# Patient Record
Sex: MALE | Race: WHITE | Employment: OTHER | URBAN - METROPOLITAN AREA
[De-identification: names, ages, dates, MRNs, and addresses within clinical notes are randomized per-mention and may not be internally consistent; named-entity substitution may affect disease eponyms.]

---

## 2017-01-01 ENCOUNTER — HOSPITAL ENCOUNTER (OUTPATIENT)
Dept: LAB | Facility: HOSPITAL | Age: 72
Discharge: HOME | End: 2017-01-01
Attending: INTERNAL MEDICINE
Payer: MEDICARE

## 2017-01-01 ENCOUNTER — HOSPITAL ENCOUNTER (OUTPATIENT)
Dept: CARDIOLOGY CLINIC | Facility: HOSPITAL | Age: 72
Discharge: HOME | End: 2017-01-01
Attending: INTERNAL MEDICINE

## 2017-01-01 DIAGNOSIS — I65.23 BILATERAL CAROTID ARTERY STENOSIS: ICD-10-CM

## 2017-01-01 DIAGNOSIS — I48.91 ATRIAL FIBRILLATION (HCC): ICD-10-CM

## 2017-01-01 PROCEDURE — 85610 PROTHROMBIN TIME: CPT

## 2017-02-08 ENCOUNTER — LAB ENCOUNTER (OUTPATIENT)
Dept: LAB | Facility: HOSPITAL | Age: 72
End: 2017-02-08
Attending: INTERNAL MEDICINE
Payer: MEDICARE

## 2017-02-08 ENCOUNTER — PRIOR ORIGINAL RECORDS (OUTPATIENT)
Dept: OTHER | Age: 72
End: 2017-02-08

## 2017-02-08 DIAGNOSIS — E78.00 PURE HYPERCHOLESTEROLEMIA: Primary | ICD-10-CM

## 2017-02-08 LAB
CHOLEST SMN-MCNC: 186 MG/DL (ref ?–200)
HDLC SERPL-MCNC: 57 MG/DL (ref 45–?)
HDLC SERPL: 3.26 {RATIO} (ref ?–4.97)
LDLC SERPL CALC-MCNC: 64 MG/DL (ref ?–130)
NONHDLC SERPL-MCNC: 129 MG/DL (ref ?–130)
TRIGLYCERIDES: 324 MG/DL (ref ?–150)
VLDL: 65 MG/DL (ref 5–40)

## 2017-02-08 PROCEDURE — 36415 COLL VENOUS BLD VENIPUNCTURE: CPT

## 2017-02-08 PROCEDURE — 80061 LIPID PANEL: CPT

## 2017-02-09 ENCOUNTER — HOSPITAL ENCOUNTER (OUTPATIENT)
Dept: LAB | Facility: HOSPITAL | Age: 72
Discharge: HOME | End: 2017-02-09
Attending: INTERNAL MEDICINE
Payer: MEDICARE

## 2017-02-09 DIAGNOSIS — I48.91 ATRIAL FIBRILLATION (HCC): ICD-10-CM

## 2017-02-09 LAB
CHOLESTEROL, TOTAL: 186 MG/DL
HDL CHOLESTEROL: 57 MG/DL
LDL CHOLESTEROL: 64 MG/DL
POC INR: 1.9 (ref 0.8–1.3)
TRIGLYCERIDES: 324 MG/DL

## 2017-02-09 PROCEDURE — 85610 PROTHROMBIN TIME: CPT

## 2017-02-13 ENCOUNTER — PRIOR ORIGINAL RECORDS (OUTPATIENT)
Dept: OTHER | Age: 72
End: 2017-02-13

## 2017-03-29 ENCOUNTER — HOSPITAL ENCOUNTER (OUTPATIENT)
Dept: LAB | Facility: HOSPITAL | Age: 72
Discharge: HOME | End: 2017-03-29
Attending: INTERNAL MEDICINE
Payer: MEDICARE

## 2017-03-29 ENCOUNTER — PRIOR ORIGINAL RECORDS (OUTPATIENT)
Dept: OTHER | Age: 72
End: 2017-03-29

## 2017-03-29 DIAGNOSIS — I48.91 ATRIAL FIBRILLATION (HCC): ICD-10-CM

## 2017-03-29 LAB — POC INR: 1.9 (ref 0.8–1.3)

## 2017-03-29 PROCEDURE — 85610 PROTHROMBIN TIME: CPT

## 2017-04-12 ENCOUNTER — HOSPITAL ENCOUNTER (OUTPATIENT)
Dept: LAB | Facility: HOSPITAL | Age: 72
Discharge: HOME | End: 2017-04-12
Attending: INTERNAL MEDICINE
Payer: MEDICARE

## 2017-04-12 DIAGNOSIS — I48.91 ATRIAL FIBRILLATION (HCC): ICD-10-CM

## 2017-04-12 PROCEDURE — 85610 PROTHROMBIN TIME: CPT

## 2017-04-27 ENCOUNTER — LAB ENCOUNTER (OUTPATIENT)
Dept: LAB | Facility: HOSPITAL | Age: 72
End: 2017-04-27
Attending: INTERNAL MEDICINE
Payer: MEDICARE

## 2017-04-27 ENCOUNTER — PRIOR ORIGINAL RECORDS (OUTPATIENT)
Dept: OTHER | Age: 72
End: 2017-04-27

## 2017-04-27 DIAGNOSIS — E78.00 PURE HYPERCHOLESTEROLEMIA: Primary | ICD-10-CM

## 2017-04-27 PROCEDURE — 80053 COMPREHEN METABOLIC PANEL: CPT

## 2017-04-27 PROCEDURE — 84443 ASSAY THYROID STIM HORMONE: CPT

## 2017-04-27 PROCEDURE — 80061 LIPID PANEL: CPT

## 2017-04-27 PROCEDURE — 84153 ASSAY OF PSA TOTAL: CPT

## 2017-04-27 PROCEDURE — 36415 COLL VENOUS BLD VENIPUNCTURE: CPT

## 2017-04-28 LAB
ALT (SGPT): 55 U/L
AST (SGOT): 43 U/L
CHOLESTEROL, TOTAL: 154 MG/DL
GLUCOSE: 104 MG/DL
HDL CHOLESTEROL: 54 MG/DL
LDL CHOLESTEROL: 65 MG/DL
THYROID STIMULATING HORMONE: 3.95 MLU/L
TRIGLYCERIDES: 174 MG/DL

## 2017-05-01 LAB
ALKALINE PHOSPHATATE(ALK PHOS): 51 IU/L
BUN: 10 MG/DL
CHOLESTEROL, TOTAL: 154 MG/DL
CREATININE, SERUM: 0.96 MG/DL
GLUCOSE: 104 MG/DL
HDL CHOLESTEROL: 54 MG/DL
LDL CHOLESTEROL: 65 MG/DL
POTASSIUM, SERUM: 3.7 MEQ/L
SGOT (AST): 43 IU/L
SGPT (ALT): 55 IU/L
SODIUM: 141 MEQ/L
TRIGLYCERIDES: 174 MG/DL

## 2017-05-03 ENCOUNTER — PRIOR ORIGINAL RECORDS (OUTPATIENT)
Dept: OTHER | Age: 72
End: 2017-05-03

## 2017-05-18 ENCOUNTER — HOSPITAL ENCOUNTER (OUTPATIENT)
Dept: LAB | Facility: HOSPITAL | Age: 72
Discharge: HOME | End: 2017-05-18
Attending: INTERNAL MEDICINE
Payer: MEDICARE

## 2017-05-18 DIAGNOSIS — I48.91 ATRIAL FIBRILLATION (HCC): ICD-10-CM

## 2017-05-18 PROCEDURE — 85610 PROTHROMBIN TIME: CPT

## 2017-05-30 ENCOUNTER — HOSPITAL ENCOUNTER (OUTPATIENT)
Dept: LAB | Facility: HOSPITAL | Age: 72
Discharge: HOME | End: 2017-05-30
Attending: INTERNAL MEDICINE
Payer: MEDICARE

## 2017-05-30 DIAGNOSIS — I48.91 ATRIAL FIBRILLATION (HCC): ICD-10-CM

## 2017-05-30 PROCEDURE — 85610 PROTHROMBIN TIME: CPT

## 2017-06-27 PROBLEM — M25.511 ACUTE PAIN OF RIGHT SHOULDER: Status: ACTIVE | Noted: 2017-06-27

## 2017-07-03 ENCOUNTER — HOSPITAL ENCOUNTER (OUTPATIENT)
Dept: LAB | Facility: HOSPITAL | Age: 72
Discharge: HOME | End: 2017-07-03
Attending: INTERNAL MEDICINE
Payer: MEDICARE

## 2017-07-03 DIAGNOSIS — I48.91 ATRIAL FIBRILLATION (HCC): ICD-10-CM

## 2017-07-03 LAB — POC INR: 3.1 (ref 0.8–1.3)

## 2017-07-03 PROCEDURE — 85610 PROTHROMBIN TIME: CPT

## 2017-07-31 ENCOUNTER — HOSPITAL ENCOUNTER (OUTPATIENT)
Dept: LAB | Facility: HOSPITAL | Age: 72
Discharge: HOME | End: 2017-07-31
Attending: INTERNAL MEDICINE
Payer: MEDICARE

## 2017-07-31 DIAGNOSIS — I48.91 ATRIAL FIBRILLATION (HCC): ICD-10-CM

## 2017-07-31 LAB — POC INR: 2.3 (ref 0.8–1.3)

## 2017-07-31 PROCEDURE — 85610 PROTHROMBIN TIME: CPT

## 2017-08-14 PROBLEM — S86.891A SHIN SPLINT, RIGHT, INITIAL ENCOUNTER: Status: ACTIVE | Noted: 2017-08-14

## 2017-08-21 ENCOUNTER — PRIOR ORIGINAL RECORDS (OUTPATIENT)
Dept: OTHER | Age: 72
End: 2017-08-21

## 2017-08-28 ENCOUNTER — HOSPITAL ENCOUNTER (OUTPATIENT)
Dept: LAB | Facility: HOSPITAL | Age: 72
Discharge: HOME | End: 2017-08-28
Attending: INTERNAL MEDICINE
Payer: MEDICARE

## 2017-08-28 DIAGNOSIS — I48.91 ATRIAL FIBRILLATION (HCC): ICD-10-CM

## 2017-08-28 LAB — POC INR: 2.7 (ref 0.8–1.3)

## 2017-08-28 PROCEDURE — 85610 PROTHROMBIN TIME: CPT

## 2017-09-25 ENCOUNTER — HOSPITAL ENCOUNTER (OUTPATIENT)
Dept: LAB | Facility: HOSPITAL | Age: 72
Discharge: HOME | End: 2017-09-25
Attending: INTERNAL MEDICINE
Payer: MEDICARE

## 2017-09-25 DIAGNOSIS — I48.91 ATRIAL FIBRILLATION (HCC): ICD-10-CM

## 2017-09-25 LAB — POC INR: 2.8 (ref 0.8–1.3)

## 2017-09-25 PROCEDURE — 85610 PROTHROMBIN TIME: CPT

## 2017-11-09 ENCOUNTER — PRIOR ORIGINAL RECORDS (OUTPATIENT)
Dept: OTHER | Age: 72
End: 2017-11-09

## 2017-11-14 ENCOUNTER — PRIOR ORIGINAL RECORDS (OUTPATIENT)
Dept: OTHER | Age: 72
End: 2017-11-14

## 2017-12-11 ENCOUNTER — PRIOR ORIGINAL RECORDS (OUTPATIENT)
Dept: OTHER | Age: 72
End: 2017-12-11

## 2017-12-12 ENCOUNTER — PRIOR ORIGINAL RECORDS (OUTPATIENT)
Dept: OTHER | Age: 72
End: 2017-12-12

## 2018-01-01 ENCOUNTER — HOSPITAL ENCOUNTER (OUTPATIENT)
Dept: LAB | Facility: HOSPITAL | Age: 73
Discharge: HOME | End: 2018-01-01
Attending: INTERNAL MEDICINE
Payer: MEDICARE

## 2018-01-01 ENCOUNTER — LAB ENCOUNTER (OUTPATIENT)
Dept: LAB | Facility: HOSPITAL | Age: 73
End: 2018-01-01
Attending: INTERNAL MEDICINE
Payer: MEDICARE

## 2018-01-01 ENCOUNTER — APPOINTMENT (OUTPATIENT)
Dept: GENERAL RADIOLOGY | Facility: HOSPITAL | Age: 73
DRG: 064 | End: 2018-01-01
Attending: EMERGENCY MEDICINE
Payer: MEDICARE

## 2018-01-01 ENCOUNTER — APPOINTMENT (OUTPATIENT)
Dept: CT IMAGING | Facility: HOSPITAL | Age: 73
DRG: 064 | End: 2018-01-01
Attending: EMERGENCY MEDICINE
Payer: MEDICARE

## 2018-01-01 ENCOUNTER — HOSPITAL ENCOUNTER (INPATIENT)
Facility: HOSPITAL | Age: 73
LOS: 1 days | DRG: 064 | End: 2018-01-01
Attending: EMERGENCY MEDICINE | Admitting: INTERNAL MEDICINE
Payer: MEDICARE

## 2018-01-01 ENCOUNTER — APPOINTMENT (OUTPATIENT)
Dept: GENERAL RADIOLOGY | Facility: HOSPITAL | Age: 73
DRG: 064 | End: 2018-01-01
Attending: INTERNAL MEDICINE
Payer: MEDICARE

## 2018-01-01 ENCOUNTER — HOSPITAL ENCOUNTER (OUTPATIENT)
Dept: LAB | Facility: HOSPITAL | Age: 73
Discharge: HOME | DRG: 064 | End: 2018-01-01
Attending: INTERNAL MEDICINE
Payer: MEDICARE

## 2018-01-01 ENCOUNTER — HOSPITAL ENCOUNTER (OUTPATIENT)
Dept: ULTRASOUND IMAGING | Facility: HOSPITAL | Age: 73
Discharge: HOME | End: 2018-01-01
Attending: INTERNAL MEDICINE
Payer: MEDICARE

## 2018-01-01 VITALS
WEIGHT: 232.13 LBS | OXYGEN SATURATION: 97 % | BODY MASS INDEX: 32.5 KG/M2 | DIASTOLIC BLOOD PRESSURE: 77 MMHG | TEMPERATURE: 99 F | HEIGHT: 71 IN | SYSTOLIC BLOOD PRESSURE: 141 MMHG

## 2018-01-01 DIAGNOSIS — Z79.01 ON WARFARIN FOR ATRIAL FIBRILLATION (HCC): ICD-10-CM

## 2018-01-01 DIAGNOSIS — I62.9 INTRACRANIAL HEMORRHAGE (HCC): Primary | ICD-10-CM

## 2018-01-01 DIAGNOSIS — I48.20 CHRONIC ATRIAL FIBRILLATION (HCC): ICD-10-CM

## 2018-01-01 DIAGNOSIS — E78.00 PURE HYPERCHOLESTEROLEMIA: Primary | ICD-10-CM

## 2018-01-01 DIAGNOSIS — E78.00 PURE HYPERCHOLESTEROLEMIA: ICD-10-CM

## 2018-01-01 DIAGNOSIS — I48.91 ATRIAL FIBRILLATION (HCC): ICD-10-CM

## 2018-01-01 DIAGNOSIS — I10 ESSENTIAL HYPERTENSION, MALIGNANT: Primary | ICD-10-CM

## 2018-01-01 DIAGNOSIS — I10 ESSENTIAL HYPERTENSION, MALIGNANT: ICD-10-CM

## 2018-01-01 DIAGNOSIS — I48.91 ON WARFARIN FOR ATRIAL FIBRILLATION (HCC): ICD-10-CM

## 2018-01-01 DIAGNOSIS — I71.4 ABDOMINAL AORTIC ANEURYSM WITHOUT RUPTURE (HCC): ICD-10-CM

## 2018-01-01 DIAGNOSIS — N40.0 BENIGN ENLARGEMENT OF PROSTATE: ICD-10-CM

## 2018-01-01 LAB
ALT SERPL-CCNC: 33 U/L (ref 17–63)
ALT SERPL-CCNC: 37 U/L (ref 17–63)
AST SERPL-CCNC: 26 U/L (ref 15–41)
AST SERPL-CCNC: 29 U/L (ref 15–41)
BUN BLD-MCNC: 14 MG/DL (ref 8–20)
CALCIUM BLD-MCNC: 9.9 MG/DL (ref 8.3–10.3)
CHLORIDE: 105 MMOL/L (ref 101–111)
CHOLEST SMN-MCNC: 166 MG/DL (ref ?–200)
CHOLEST SMN-MCNC: 167 MG/DL (ref ?–200)
CO2: 32 MMOL/L (ref 22–32)
CREAT BLD-MCNC: 1 MG/DL (ref 0.7–1.3)
GLUCOSE BLD-MCNC: 100 MG/DL (ref 70–99)
HDLC SERPL-MCNC: 52 MG/DL (ref 40–59)
HDLC SERPL-MCNC: 55 MG/DL (ref 45–?)
HDLC SERPL: 3.04 {RATIO} (ref ?–4.97)
INR BLD: 2.17 (ref 0.9–1.1)
INR BLD: 2.38 (ref 0.9–1.1)
LDLC SERPL CALC-MCNC: 70 MG/DL (ref ?–130)
LDLC SERPL CALC-MCNC: 80 MG/DL (ref ?–100)
NONHDLC SERPL-MCNC: 112 MG/DL (ref ?–130)
NONHDLC SERPL-MCNC: 114 MG/DL (ref ?–130)
POC INR: 2.1 (ref 0.8–1.3)
POC INR: 2.4 (ref 0.8–1.3)
POC INR: 2.5 (ref 0.8–1.3)
POTASSIUM SERPL-SCNC: 3.2 MMOL/L (ref 3.6–5.1)
PSA SERPL DL<=0.01 NG/ML-MCNC: 24.9 SECONDS (ref 12.4–14.7)
PSA SERPL DL<=0.01 NG/ML-MCNC: 26.8 SECONDS (ref 12.4–14.7)
SODIUM SERPL-SCNC: 142 MMOL/L (ref 136–144)
TRIGL SERPL-MCNC: 172 MG/DL (ref 30–149)
TRIGL SERPL-MCNC: 208 MG/DL (ref ?–150)
VLDLC SERPL CALC-MCNC: 34 MG/DL (ref 0–30)
VLDLC SERPL CALC-MCNC: 42 MG/DL (ref 5–40)

## 2018-01-01 PROCEDURE — 99291 CRITICAL CARE FIRST HOUR: CPT | Performed by: OTHER

## 2018-01-01 PROCEDURE — 85610 PROTHROMBIN TIME: CPT

## 2018-01-01 PROCEDURE — 85610 PROTHROMBIN TIME: CPT | Performed by: INTERNAL MEDICINE

## 2018-01-01 PROCEDURE — 84460 ALANINE AMINO (ALT) (SGPT): CPT

## 2018-01-01 PROCEDURE — 36415 COLL VENOUS BLD VENIPUNCTURE: CPT | Performed by: INTERNAL MEDICINE

## 2018-01-01 PROCEDURE — 36415 COLL VENOUS BLD VENIPUNCTURE: CPT

## 2018-01-01 PROCEDURE — 99232 SBSQ HOSP IP/OBS MODERATE 35: CPT | Performed by: INTERNAL MEDICINE

## 2018-01-01 PROCEDURE — 76770 US EXAM ABDO BACK WALL COMP: CPT | Performed by: INTERNAL MEDICINE

## 2018-01-01 PROCEDURE — 84153 ASSAY OF PSA TOTAL: CPT

## 2018-01-01 PROCEDURE — 70450 CT HEAD/BRAIN W/O DYE: CPT | Performed by: EMERGENCY MEDICINE

## 2018-01-01 PROCEDURE — 80048 BASIC METABOLIC PNL TOTAL CA: CPT

## 2018-01-01 PROCEDURE — 71045 X-RAY EXAM CHEST 1 VIEW: CPT | Performed by: INTERNAL MEDICINE

## 2018-01-01 PROCEDURE — 99223 1ST HOSP IP/OBS HIGH 75: CPT | Performed by: INTERNAL MEDICINE

## 2018-01-01 PROCEDURE — 80061 LIPID PANEL: CPT

## 2018-01-01 PROCEDURE — 99223 1ST HOSP IP/OBS HIGH 75: CPT | Performed by: NEUROLOGICAL SURGERY

## 2018-01-01 PROCEDURE — 70496 CT ANGIOGRAPHY HEAD: CPT | Performed by: EMERGENCY MEDICINE

## 2018-01-01 PROCEDURE — 80053 COMPREHEN METABOLIC PANEL: CPT

## 2018-01-01 PROCEDURE — 70498 CT ANGIOGRAPHY NECK: CPT | Performed by: EMERGENCY MEDICINE

## 2018-01-01 PROCEDURE — 5A1945Z RESPIRATORY VENTILATION, 24-96 CONSECUTIVE HOURS: ICD-10-PCS | Performed by: EMERGENCY MEDICINE

## 2018-01-01 PROCEDURE — 71045 X-RAY EXAM CHEST 1 VIEW: CPT | Performed by: EMERGENCY MEDICINE

## 2018-01-01 PROCEDURE — 84450 TRANSFERASE (AST) (SGOT): CPT

## 2018-01-01 PROCEDURE — 88305 TISSUE EXAM BY PATHOLOGIST: CPT | Performed by: INTERNAL MEDICINE

## 2018-01-01 PROCEDURE — 99233 SBSQ HOSP IP/OBS HIGH 50: CPT | Performed by: OTHER

## 2018-01-01 RX ORDER — ZOLPIDEM TARTRATE 5 MG/1
5 TABLET ORAL NIGHTLY PRN
Refills: 3 | COMMUNITY
Start: 2018-01-01

## 2018-01-01 RX ORDER — CHLORHEXIDINE GLUCONATE 0.12 MG/ML
15 RINSE ORAL 2 TIMES DAILY
Status: DISCONTINUED | OUTPATIENT
Start: 2018-01-01 | End: 2018-01-01

## 2018-01-01 RX ORDER — ONDANSETRON 2 MG/ML
4 INJECTION INTRAMUSCULAR; INTRAVENOUS EVERY 4 HOURS PRN
Status: DISCONTINUED | OUTPATIENT
Start: 2018-01-01 | End: 2018-01-01

## 2018-01-01 RX ORDER — SODIUM CHLORIDE 9 MG/ML
INJECTION, SOLUTION INTRAVENOUS CONTINUOUS
Status: ACTIVE | OUTPATIENT
Start: 2018-01-01 | End: 2018-01-01

## 2018-01-01 RX ORDER — DOPAMINE HYDROCHLORIDE 320 MG/100ML
INJECTION, SOLUTION INTRAVENOUS CONTINUOUS
Status: DISCONTINUED | OUTPATIENT
Start: 2018-01-01 | End: 2018-01-01

## 2018-01-01 RX ORDER — DOPAMINE HYDROCHLORIDE 320 MG/100ML
INJECTION, SOLUTION INTRAVENOUS
Status: DISCONTINUED
Start: 2018-01-01 | End: 2018-01-01

## 2018-01-01 RX ORDER — LORAZEPAM 2 MG/ML
1 INJECTION INTRAMUSCULAR
Status: DISCONTINUED | OUTPATIENT
Start: 2018-01-01 | End: 2018-01-01

## 2018-01-01 RX ORDER — BISACODYL 10 MG
10 SUPPOSITORY, RECTAL RECTAL
Status: DISCONTINUED | OUTPATIENT
Start: 2018-01-01 | End: 2018-01-01

## 2018-01-01 RX ORDER — DOCUSATE SODIUM 100 MG/1
100 CAPSULE, LIQUID FILLED ORAL 2 TIMES DAILY
Status: DISCONTINUED | OUTPATIENT
Start: 2018-01-01 | End: 2018-01-01

## 2018-01-01 RX ORDER — POLYETHYLENE GLYCOL 3350 17 G/17G
17 POWDER, FOR SOLUTION ORAL DAILY PRN
Status: DISCONTINUED | OUTPATIENT
Start: 2018-01-01 | End: 2018-01-01

## 2018-01-01 RX ORDER — SODIUM CHLORIDE 9 MG/ML
INJECTION, SOLUTION INTRAVENOUS CONTINUOUS
Status: DISCONTINUED | OUTPATIENT
Start: 2018-01-01 | End: 2018-01-01

## 2018-01-01 RX ORDER — SENNOSIDES 8.6 MG
17.2 TABLET ORAL NIGHTLY
Status: DISCONTINUED | OUTPATIENT
Start: 2018-01-01 | End: 2018-01-01

## 2018-01-01 RX ORDER — ACETAMINOPHEN 650 MG/1
650 SUPPOSITORY RECTAL EVERY 4 HOURS PRN
Status: DISCONTINUED | OUTPATIENT
Start: 2018-01-01 | End: 2018-01-01

## 2018-01-01 RX ORDER — MORPHINE SULFATE 4 MG/ML
2 INJECTION, SOLUTION INTRAMUSCULAR; INTRAVENOUS EVERY 2 HOUR PRN
Status: DISCONTINUED | OUTPATIENT
Start: 2018-01-01 | End: 2018-01-01

## 2018-01-01 RX ORDER — TADALAFIL 20 MG
20 TABLET ORAL
Refills: 6 | COMMUNITY
Start: 2018-01-01

## 2018-01-01 RX ORDER — SODIUM PHOSPHATE, DIBASIC AND SODIUM PHOSPHATE, MONOBASIC 7; 19 G/133ML; G/133ML
1 ENEMA RECTAL ONCE AS NEEDED
Status: DISCONTINUED | OUTPATIENT
Start: 2018-01-01 | End: 2018-01-01

## 2018-01-01 RX ORDER — CARVEDILOL 12.5 MG/1
12.5 TABLET ORAL 2 TIMES DAILY
Refills: 12 | COMMUNITY
Start: 2018-01-01

## 2018-01-01 RX ORDER — LABETALOL HYDROCHLORIDE 5 MG/ML
10 INJECTION, SOLUTION INTRAVENOUS EVERY 10 MIN PRN
Status: DISCONTINUED | OUTPATIENT
Start: 2018-01-01 | End: 2018-01-01

## 2018-01-01 RX ORDER — PHENYLEPHRINE HCL IN 0.9% NACL 50MG/250ML
PLASTIC BAG, INJECTION (ML) INTRAVENOUS CONTINUOUS PRN
Status: DISCONTINUED | OUTPATIENT
Start: 2018-01-01 | End: 2018-01-01

## 2018-01-01 RX ORDER — FAMOTIDINE 10 MG/ML
20 INJECTION, SOLUTION INTRAVENOUS DAILY
Status: DISCONTINUED | OUTPATIENT
Start: 2018-01-01 | End: 2018-01-01

## 2018-01-01 RX ORDER — ONDANSETRON 2 MG/ML
4 INJECTION INTRAMUSCULAR; INTRAVENOUS EVERY 6 HOURS PRN
Status: DISCONTINUED | OUTPATIENT
Start: 2018-01-01 | End: 2018-01-01

## 2018-01-01 RX ORDER — SPIRONOLACTONE 25 MG/1
12.5 TABLET ORAL
Refills: 2 | COMMUNITY
Start: 2018-01-01

## 2018-01-01 RX ORDER — MORPHINE SULFATE 4 MG/ML
1 INJECTION, SOLUTION INTRAMUSCULAR; INTRAVENOUS EVERY 2 HOUR PRN
Status: DISCONTINUED | OUTPATIENT
Start: 2018-01-01 | End: 2018-01-01

## 2018-01-01 RX ORDER — METOCLOPRAMIDE HYDROCHLORIDE 5 MG/ML
10 INJECTION INTRAMUSCULAR; INTRAVENOUS EVERY 8 HOURS PRN
Status: DISCONTINUED | OUTPATIENT
Start: 2018-01-01 | End: 2018-01-01

## 2018-01-01 RX ORDER — SCOLOPAMINE TRANSDERMAL SYSTEM 1 MG/1
1 PATCH, EXTENDED RELEASE TRANSDERMAL
Status: DISCONTINUED | OUTPATIENT
Start: 2018-01-01 | End: 2018-01-01

## 2018-01-01 RX ORDER — ACETAMINOPHEN 325 MG/1
650 TABLET ORAL EVERY 4 HOURS PRN
Status: DISCONTINUED | OUTPATIENT
Start: 2018-01-01 | End: 2018-01-01

## 2018-01-01 RX ORDER — FAMOTIDINE 20 MG/1
20 TABLET ORAL DAILY
Status: DISCONTINUED | OUTPATIENT
Start: 2018-01-01 | End: 2018-01-01

## 2018-01-01 RX ORDER — ONDANSETRON 2 MG/ML
4 INJECTION INTRAMUSCULAR; INTRAVENOUS ONCE
Status: COMPLETED | OUTPATIENT
Start: 2018-01-01 | End: 2018-01-01

## 2018-01-05 ENCOUNTER — PRIOR ORIGINAL RECORDS (OUTPATIENT)
Dept: OTHER | Age: 73
End: 2018-01-05

## 2018-02-07 ENCOUNTER — PRIOR ORIGINAL RECORDS (OUTPATIENT)
Dept: OTHER | Age: 73
End: 2018-02-07

## 2018-02-12 ENCOUNTER — PRIOR ORIGINAL RECORDS (OUTPATIENT)
Dept: OTHER | Age: 73
End: 2018-02-12

## 2018-02-15 LAB
BUN: 14 MG/DL
CALCIUM: 9.9 MG/DL
CHLORIDE: 105 MEQ/L
CHOLESTEROL, TOTAL: 167 MG/DL
CREATININE, SERUM: 1 MG/DL
GLUCOSE: 100 MG/DL
HDL CHOLESTEROL: 55 MG/DL
LDL CHOLESTEROL: 70 MG/DL
POTASSIUM, SERUM: 3.2 MEQ/L
SGOT (AST): 26 IU/L
SGPT (ALT): 33 IU/L
SODIUM: 142 MEQ/L
TRIGLYCERIDES: 208 MG/DL

## 2018-04-17 ENCOUNTER — PRIOR ORIGINAL RECORDS (OUTPATIENT)
Dept: OTHER | Age: 73
End: 2018-04-17

## 2018-04-18 ENCOUNTER — PRIOR ORIGINAL RECORDS (OUTPATIENT)
Dept: OTHER | Age: 73
End: 2018-04-18

## 2018-04-20 ENCOUNTER — PRIOR ORIGINAL RECORDS (OUTPATIENT)
Dept: OTHER | Age: 73
End: 2018-04-20

## 2018-04-24 ENCOUNTER — PRIOR ORIGINAL RECORDS (OUTPATIENT)
Dept: OTHER | Age: 73
End: 2018-04-24

## 2018-04-24 LAB
BUN: 15 MG/DL
CALCIUM: 9.8 MG/DL
CHLORIDE: 106 MEQ/L
CREATININE, SERUM: 1.24 MG/DL
GLUCOSE: 100 MG/DL
POTASSIUM, SERUM: 5.3 MEQ/L
SODIUM: 136 MEQ/L

## 2018-05-03 ENCOUNTER — PRIOR ORIGINAL RECORDS (OUTPATIENT)
Dept: OTHER | Age: 73
End: 2018-05-03

## 2018-05-09 ENCOUNTER — PRIOR ORIGINAL RECORDS (OUTPATIENT)
Dept: OTHER | Age: 73
End: 2018-05-09

## 2018-05-09 LAB
ALBUMIN: 4 G/DL
ALKALINE PHOSPHATATE(ALK PHOS): 45 IU/L
BILIRUBIN TOTAL: 1.6 MG/DL
BUN: 17 MG/DL
CALCIUM: 9.6 MG/DL
CHLORIDE: 107 MEQ/L
CHOLESTEROL, TOTAL: 112 MG/DL
CREATININE, SERUM: 0.98 MG/DL
GLUCOSE: 98 MG/DL
HDL CHOLESTEROL: 42 MG/DL
LDL CHOLESTEROL: 45 MG/DL
PROTEIN, TOTAL: 7.5 G/DL
SGOT (AST): 38 IU/L
SGPT (ALT): 51 IU/L
SODIUM: 141 MEQ/L
TRIGLYCERIDES: 124 MG/DL

## 2018-05-21 ENCOUNTER — PRIOR ORIGINAL RECORDS (OUTPATIENT)
Dept: OTHER | Age: 73
End: 2018-05-21

## 2018-05-29 ENCOUNTER — PRIOR ORIGINAL RECORDS (OUTPATIENT)
Dept: OTHER | Age: 73
End: 2018-05-29

## 2018-05-31 ENCOUNTER — PRIOR ORIGINAL RECORDS (OUTPATIENT)
Dept: OTHER | Age: 73
End: 2018-05-31

## 2018-08-23 ENCOUNTER — PRIOR ORIGINAL RECORDS (OUTPATIENT)
Dept: OTHER | Age: 73
End: 2018-08-23

## 2018-08-24 LAB
ALT (SGPT): 37 U/L
AST (SGOT): 29 U/L
CHOLESTEROL, TOTAL: 166 MG/DL
HDL CHOLESTEROL: 52 MG/DL
LDL CHOLESTEROL: 80 MG/DL
TRIGLYCERIDES: 172 MG/DL

## 2018-08-27 ENCOUNTER — PRIOR ORIGINAL RECORDS (OUTPATIENT)
Dept: OTHER | Age: 73
End: 2018-08-27

## 2018-10-08 LAB — INR: 2.79

## 2018-10-09 ENCOUNTER — PRIOR ORIGINAL RECORDS (OUTPATIENT)
Dept: OTHER | Age: 73
End: 2018-10-09

## 2018-10-09 PROBLEM — Z79.01 ON WARFARIN FOR ATRIAL FIBRILLATION (HCC): Status: ACTIVE | Noted: 2018-01-01

## 2018-10-09 PROBLEM — I48.91 ON WARFARIN FOR ATRIAL FIBRILLATION (HCC): Status: ACTIVE | Noted: 2018-01-01

## 2018-10-09 PROBLEM — I62.9 INTRACRANIAL HEMORRHAGE (HCC): Status: ACTIVE | Noted: 2018-01-01

## 2018-10-09 NOTE — PROGRESS NOTES
10/09/18 1119   Clinical Encounter Type   Visited With Patient and family together   Crisis Visit ED   Patient's Supportive Strategies/Resources  provided support to patient's spouse.      Sacramental Encounters   Sacrament of Sick-Anointing Anoi

## 2018-10-09 NOTE — PROGRESS NOTES
Thank you for consulting [facility] on this referral.     Mr. Ankit Martinez is in the first person registry and his wife has completed authorization paperwork with [facility] so that he can be an organ and tissue donor once declared brain dead.      Please call Gift o

## 2018-10-09 NOTE — CONSULTS
[facility]  Cardiology Consultation    Brian Ramirez Jr.  Patient Status:  Inpatient    1945 MRN [id number]   [facility] 6NE-A Attending Leonela Frost MD   Hosp Day # 0 PCP Trice Guzman MD     Reason for Consultation *Warfarin Sodium      5MG       ONE-HALF TO ONE TABLET BY MOUTH DUDLEY     02/12/18 *AmLODIPine Besylate  5MG       1 TABLET BY MOUTH DAILY                  02/12/18 *Carvedilol           12. 5MG    1 TABLET TWICE DAILY AS DIRECTED.        02/12/18 *Digox FLEET ENEMA (FLEET) 7-19 GM/118ML enema 133 mL, 1 enema, Rectal, Once PRN  •  ondansetron HCl (ZOFRAN) injection 4 mg, 4 mg, Intravenous, Q6H PRN **OR** Metoclopramide HCl (REGLAN) injection 10 mg, 10 mg, Intravenous, Q8H PRN  •  famoTIDine (PEPCID) tab 2 10/09/2018    ALT 42 10/09/2018    PTT 27.2 10/09/2018    INR 2.68 10/09/2018    TROP <0.046 10/09/2018   ekg: afib with controlled vr.     Imaging:  Ct brain: extensive hemorrhage- intracerebral and in ventricles. Impression:  Principal Problem:     In

## 2018-10-09 NOTE — ED PROVIDER NOTES
Patient Seen in: [facility] Emergency Department    History   Patient presents with:  Altered Mental Status (neurologic)    Stated Complaint: ams    HPI    Patient is a 51-year-old male, who arrives via EMS for evaluation after being found in his be file      Review of Systems    Positive for stated complaint: ams  Other systems are as noted in HPI. Constitutional and vital signs reviewed. All other systems reviewed and negative except as noted above.     Physical Exam     ED Triage Vitals   BP 1 components:    ABG pO2 149 (*)     All other components within normal limits   POCT GLUCOSE - Abnormal; Notable for the following components:    POC Glucose 226 (*)     All other components within normal limits   CBC W/ DIFFERENTIAL - Abnormal; Notable for Acute hemorrhage present, with extensive intraventricular blood within both lateral ventricles, especially in the left lateral ventricle with hyperdense blood clot material, also present in the 3rd ventricle essentially filling the 3rd ventricle, and filli CHEST AP PORTABLE  (CPT=71045)  TECHNIQUE:  AP chest radiograph was obtained. COMPARISON:  None. INDICATIONS:  77-year-old male with altered mental status, found unresponsive this morning by the patient's wife.   Patient was also found covered in emesis w Radiology) NRDR ([address]) which includes the Dose Index Registry.   PATIENT STATED HISTORY:(As transcribed by Technologist)  Unresponsive this am.   CONTRAST USED:  75cc of Omnipaque 350 FINDINGS:   The upper cervical, petrous, cave severe stenosis in the would right V4 segment due to calcified atherosclerotic plaque. Calcified plaque in the dominant left vertebral artery V4 segment is also noted without significant stenosis.   No other region of severe stenosis in the major intracran Disposition and Plan     Clinical Impression:  Intracranial hemorrhage ([facility] [address]  (primary encounter diagnosis)  On warfarin for atrial fibrillation    Disposition:  Admit  10/9/2018 10:41 am    Follow-up:  No follow-up provider specified.       Medicatio

## 2018-10-09 NOTE — CONSULTS
[facility]  Report of Consultation    Carmenlewis Garcia.  Patient Status:  Inpatient    1945 MRN [id number]   [facility] 6NE-A Attending Adry Baeza MD   Hosp Day # 0 PCP Marianna Beverly MD     Reason for [facility] spironolactone 25 MG Oral Tab Take 12.5 mg by mouth daily with food. Disp:  Rfl: 2    Zolpidem Tartrate 5 MG Oral Tab Take 5 mg by mouth nightly as needed for Sleep. Disp:  Rfl: 3    CIALIS 20 MG Oral Tab Take 20 mg by mouth.  Disp:  Rfl: 6    Omega-3 Fat 15, auto PEEP less than 3   Chest wall: No tenderness or deformity. Heart: Irregular, bradycardic, normal S1S2, no murmur. Abdomen: soft, non-tender, non-distended, no masses, no guarding, no     rebound, positive BS.    Extremity: no edema, no cyanosis

## 2018-10-09 NOTE — RESPIRATORY THERAPY NOTE
Left radial arterial line placed in sterile fashion. Threaded easily with no resistance, good waveform, draws back easily.

## 2018-10-09 NOTE — HISTORICAL OFFICE NOTE
Becky Finn  : 1945  ACCOUNT:  [id number]  [phone]  PCP: Dr. Nolan Charles     TODAY'S DATE: 2018  DICTATED BY:  [Dr. Nishant Bobo      CHIEF COMPLAINT: [Followup of Abnormal UFCT, Followup of Carotid artery stenosis, bilateral, Fol tobacco use. used the smoke but quit 1978. CAFFEINE: 2-3 beverages daily. ALCOHOL: drinks 1-2 per day. EXERCISE: no regular exercise. DIET: no special diet. MARITAL STATUS: . OCCUPATION: .      ALLERGIES: No Known Allergies    MED the office in 6 months with a lipid profile.]    PRESCRIPTIONS:   07/20/18 *Warfarin Sodium      5MG       ONE-HALF TO ONE TABLET BY MOUTH DUDLEY     06/22/18 *Warfarin Sodium      5MG       ONE-HALF TO ONE TABLET BY MOUTH DUDLEY     02/12/18 *AmLODIPine Besyl

## 2018-10-09 NOTE — CONSULTS
[facility]  Neurocritical Care       Name: Prince Eckert.   MRN: [id number]  Admission Date/Time: 10/9/2018  8:21 AM  Primary Care Provider:  Hui Hammer MD        Reason for Consultation:   ICH and IVH    HPI:   Patient is Right leg pain         Date Noted: 10/01/2013      Past Medical History:   Diagnosis Date   • Atrial fibrillation ([facility] [address]    • High blood pressure    • High cholesterol    • Sleep apnea        Past Surgical History:   Procedure Laterality Date   • COLONOSCOP kg/m². Intake/Output:    Intake/Output Summary (Last 24 hours) at 10/9/2018 1027  Last data filed at 10/9/2018 1012  Gross per 24 hour   Intake 130 ml   Output —   Net 130 ml       HEENT- NC/AT - intubated.   Lungs - Clear  Heart - S1,S2    NEUROLOGICAL effaced, and there is a tight appearance at the foramen magnum with low appearance of the cerebellar tonsils, and the features are worrisome for impending herniation.   Critical value result, Dr Nitish Selby notified of the findings by telephone at  0900 hr, 10/9 interstitial type opacities within the right upper lobe. 3. Short-term followup is recommended. Dictated by: Jass Fails, DO on 10/09/2018 at 9:03     Approved by:  Jass Fails, DO            Cta Brain + Cta Carotids (UNQ=[id number])    Result Date: origins of the bilateral PICA are seen. There is a 3-vessel aortic arch. The origins of the branch vessels appear widely patent. Calcified plaque at the proximal left subclavian artery is causing mild stenosis.   Disc plate arteries are overall patent wi 10/09/2018    .0 10/09/2018    CREATSERUM 0.88 10/09/2018    BUN 15 10/09/2018     10/09/2018    K 3.9 10/09/2018     10/09/2018    CO2 25.0 10/09/2018     10/09/2018    CA 9.1 10/09/2018    ALB 3.8 10/09/2018    ALKPHO 46 10/09/2 8.8  - Monitor H/H with goal hemoglobin more than 7gm/dl  Endocrine:  - Hyperglycemia protocol if req  Skin:  - Monitor for skin breakdown.   DVT Prophylaxis:  - SCD’s    ABCDEF:  A: Pain score unable to assess  B: Vented   C: RASS -4   D: CAM ICU -  E: PT/

## 2018-10-09 NOTE — ED INITIAL ASSESSMENT (HPI)
Pt wife attempted to wake him this morning, he was unresponsive, intubated by EMS, pt was covered with emesis. Respirations were very shallow upon EMS arrival. Given ketamine and versed for intubated.

## 2018-10-09 NOTE — SLP NOTE
Order received, chart reviewed. Patient orally intubated and not appropriate for evaluation at this time. Will hold today and continue to follow.     Pradip Cobb Adolfo [address] CCC-SLP  Pager [phone]

## 2018-10-09 NOTE — H&P
[facility]  History and Physical     Baltazar Sale.  Patient Status:  Emergency    1945 MRN [id number]   Location [address] Attending Elo Benavidez MD   Hosp Day # 0 PCP Michelle Arriaga MD     Chi mouth daily.  Disp:  Rfl:    Warfarin Sodium (COUMADIN) 5 MG Oral Tab  Disp:  Rfl:    AmLODIPine Besylate (NORVASC) 5 MG Oral Tab  Disp:  Rfl:    digoxin (LANOXIN) 0.125 MG Oral Tab  Disp:  Rfl:    CRESTOR 10 MG Oral Tab  Disp:  Rfl:    Doxazosin Mesylate ( Epic.      ASSESSMENT / PLAN:     1. Intracranial Hemorrhage w/ massive IVF  1. Monitor in CNICU  2. Neurochecks  3. reveresed coumadin with KCentra and Vit K  4. BP control  5. NS and CHERELLE following  6. Mannitol  7. CT scan in AM vs earlier if needed  8.  O

## 2018-10-09 NOTE — CONSULTS
[facility]  Neurosurgery Consult    [address]  Patient Status:  Inpatient    1945 MRN [id number]   [facility] 6NE-A Attending Sam Dumont MD   Hosp Day # 0 PCP Jose Logan MD     REASON FOR CONSULTATION: Rfl: 6    Omega-3 Fatty Acids (FISH OIL OR) Take by mouth. Disp:  Rfl:  Past Week   Multiple Vitamin (MULTI-VITAMIN DAILY) Oral Tab Take by mouth. Disp:  Rfl:  Past Week   Ascorbic Acid (VITAMIN C) 100 MG Oral Tab Take 100 mg by mouth daily.  Disp:  Rfl:  4 [facility]) injection 4 mg 4 mg Intravenous Q6H PRN   Or      Metoclopramide HCl (REGLAN) injection 10 mg 10 mg Intravenous Q8H PRN   famoTIDine (PEPCID) tab 20 mg 20 mg Oral Daily   Or      famoTIDine (PEPCID) injection 20 mg 20 mg Intravenous Daily   Betty López hydrocephalus. GCS 3T with no meaningful neurological function    PLAN:  -no surgical intervention recommended at this time    Discussed with patient's wife, daughter and son at bedside. Patient has sustained a devastating hemorrhage.   Only possible treat

## 2018-10-10 NOTE — PROGRESS NOTES
[facility]  Neurosurgery Progress Note    Kory Alcantar.  Patient Status:  Inpatient    1945 MRN [id number]   [facility] 6NE-A Attending Ghazal Ferreira MD   Hosp Day # 1 PCP Kayleigh Padilla MD     Subjective:  Pt exam

## 2018-10-10 NOTE — PROGRESS NOTES
Rec'd pt from ED. Pt is essentially unresponsive on full ventilator support. [facility] notified of impending death. Pt is on registry and would be candidate for organ donation.  [id number] speaks to family, family agrees to postpone withdrawal of care and let p

## 2018-10-10 NOTE — PROGRESS NOTES
[facility]  Progress Note    Franko Hesterigham. Subjective:  No change overnight. Family at bedside.        Intake/Output:    Intake/Output Summary (Last 24 hours) at 10/10/2018 1123  Last data filed at 10/10/2018 0600  Gross per 24 hour   Intake 650 mg Oral Q4H PRN   Or      acetaminophen (TYLENOL) 650 MG rectal suppository 650 mg 650 mg Rectal Q4H PRN   morphINE sulfate (PF) 4 MG/ML injection 1 mg 1 mg Intravenous Q2H PRN   Or      morphINE sulfate (PF) 4 MG/ML injection 2 mg 2 mg Intravenous Q2H

## 2018-10-10 NOTE — PLAN OF CARE
Assumed care of Case Barcenas @ approximately 19:15 [facility] neuro checks; unresponsive except transient, small, spontaneous, non-purposeful foot movements, and extension to pain. Pupillometer shows minimal response; no cough nor gag.  Tmax = 100.5: Tylenol suppository

## 2018-10-10 NOTE — PROGRESS NOTES
[facility]  Neurocritical Care       Subjective: Arielle Reyesinocencio. is a(n) 68year old male s/p massive ICH/IVH impending brain death, minimal breathing over vent yesterday. Review of Systems    Unable to obtain.     Vitals:   Blo lateral ventricle body about  5.1 cm.   The ventricular dilation appears mostly on the basis of obstructive hydrocephalus, although the large amount of blood clot in the left lateral ventricle by itself is causing expansion of the ventricle from the presenc approximately 3.5 cm above the carinal and appears adequately positioned. There are few patchy interstitial type opacities noted within the right upper lobe. Short-term followup is recommended. The remainder of the lungs are clear.   Cardiac silhouette i communicating artery is seen along with a small left posterior communicating artery. intimal irregularities throughout the posterior cerebral arteries is noted. Minimal intimal irregularities in the middle cerebral artery is is also noted.   No significa the origin of the hypoplastic right vertebral artery due to calcified plaque. 4.  Patchy consolidations in the right lung may be due to aspiration. 5.  Large amount of intracranial hemorrhage is unchanged since recent examination.   Please correlate with 650 mg Rectal Q4H PRN   morphINE sulfate (PF) 4 MG/ML injection 1 mg 1 mg Intravenous Q2H PRN   Or      morphINE sulfate (PF) 4 MG/ML injection 2 mg 2 mg Intravenous Q2H PRN   Labetalol HCl (TRANDATE) injection 10 mg 10 mg Intravenous Q10 Min PRN   niCARdi Fluids NS at 75ml/hr  - Lipid Panel. Restart /Continue Statin, goal Keep LDL < 70 and HDL > or around 40.  - Seizure prophylaxis - if required. - Hold all antiplatelets and anticoagulants as of now.    - CT Brain and CTA Brain as above  - Neurosurgery Cons

## 2018-10-10 NOTE — SLP NOTE
Note plan to transition to comfort care. Will sign off.     Melissa Cobb Adolfo 87 CCC-SLP  Pager [phone]

## 2018-10-10 NOTE — OCCUPATIONAL THERAPY NOTE
Order received for OT eval. Pt to transition to comfort care and not appropriate for therapy. Will D/C OT. Please re-order if change in status.

## 2018-10-10 NOTE — PHYSICAL THERAPY NOTE
Order received for PT eval. Pt to transition to comfort care and not appropriate for therapy. Will D/C PT. Please re-order if change in status.

## 2018-10-11 NOTE — DISCHARGE SUMMARY
[facility]  DISCHARGE SUMMARY     Arielle Mello.  Patient Status:  Inpatient    1945 MRN [id number]   [facility] 6NE-A Attending No att. providers found   Hosp Day # 1 PCP Trice Guzman MD     Date of Admission: 10/ carvedilol 12.5 MG Tabs  Commonly known as:  COREG      Take 12.5 mg by mouth 2 (two) times daily. Refills:  12     CIALIS 20 MG Tabs  Generic drug: Tadalafil      Take 20 mg by mouth.    Refills:  6     CRESTOR 10 MG Tabs  Generic drug:  Rosuvastatin

## 2019-02-28 VITALS
HEIGHT: 71 IN | HEART RATE: 78 BPM | WEIGHT: 230 LBS | BODY MASS INDEX: 32.2 KG/M2 | DIASTOLIC BLOOD PRESSURE: 70 MMHG | SYSTOLIC BLOOD PRESSURE: 114 MMHG

## 2019-02-28 VITALS
SYSTOLIC BLOOD PRESSURE: 120 MMHG | HEIGHT: 71 IN | DIASTOLIC BLOOD PRESSURE: 80 MMHG | BODY MASS INDEX: 34.58 KG/M2 | WEIGHT: 247 LBS | HEART RATE: 50 BPM

## 2019-02-28 VITALS
HEIGHT: 71 IN | DIASTOLIC BLOOD PRESSURE: 64 MMHG | BODY MASS INDEX: 34.16 KG/M2 | WEIGHT: 244 LBS | HEART RATE: 62 BPM | SYSTOLIC BLOOD PRESSURE: 130 MMHG

## 2019-03-01 VITALS
HEART RATE: 70 BPM | HEIGHT: 71 IN | BODY MASS INDEX: 35.28 KG/M2 | SYSTOLIC BLOOD PRESSURE: 138 MMHG | DIASTOLIC BLOOD PRESSURE: 72 MMHG | WEIGHT: 252 LBS

## 2019-03-01 VITALS — HEART RATE: 60 BPM | SYSTOLIC BLOOD PRESSURE: 122 MMHG | DIASTOLIC BLOOD PRESSURE: 70 MMHG

## 2024-10-21 NOTE — PROGRESS NOTES
10/09/18 1838   Clinical Encounter Type   Visited With Health care provider  ([facility] team)   Routine Visit Follow-up   Continue Visiting Yes   Crisis Visit Critical care    was approached by [facility] staff to update on patient status. My signature below certifies that the above stated patient is homebound and upon completion of the Face-To-Face encounter, has the need for intermittent skilled nursing, physical therapy and/or speech or occupational therapy services in their home for their current diagnosis as outlined in their initial plan of care. These services will continue to be monitored by myself or another physician.